# Patient Record
Sex: FEMALE | HISPANIC OR LATINO | Employment: FULL TIME | URBAN - METROPOLITAN AREA
[De-identification: names, ages, dates, MRNs, and addresses within clinical notes are randomized per-mention and may not be internally consistent; named-entity substitution may affect disease eponyms.]

---

## 2019-06-04 LAB — PAP-ABSTRACT: NORMAL

## 2021-02-17 ENCOUNTER — TRANSFERRED RECORDS (OUTPATIENT)
Dept: HEALTH INFORMATION MANAGEMENT | Facility: CLINIC | Age: 51
End: 2021-02-17
Payer: COMMERCIAL

## 2022-01-27 ENCOUNTER — OFFICE VISIT (OUTPATIENT)
Dept: FAMILY MEDICINE | Facility: CLINIC | Age: 52
End: 2022-01-27
Payer: COMMERCIAL

## 2022-01-27 ENCOUNTER — ANCILLARY PROCEDURE (OUTPATIENT)
Dept: GENERAL RADIOLOGY | Facility: CLINIC | Age: 52
End: 2022-01-27
Attending: PHYSICIAN ASSISTANT
Payer: COMMERCIAL

## 2022-01-27 VITALS
BODY MASS INDEX: 39.31 KG/M2 | SYSTOLIC BLOOD PRESSURE: 132 MMHG | HEART RATE: 69 BPM | WEIGHT: 182.2 LBS | DIASTOLIC BLOOD PRESSURE: 66 MMHG | TEMPERATURE: 98.6 F | HEIGHT: 57 IN | OXYGEN SATURATION: 98 %

## 2022-01-27 DIAGNOSIS — L71.9 ROSACEA: ICD-10-CM

## 2022-01-27 DIAGNOSIS — N39.46 MIXED INCONTINENCE URGE AND STRESS (MALE)(FEMALE): ICD-10-CM

## 2022-01-27 DIAGNOSIS — M54.50 ACUTE BILATERAL LOW BACK PAIN WITHOUT SCIATICA: ICD-10-CM

## 2022-01-27 DIAGNOSIS — M54.50 ACUTE BILATERAL LOW BACK PAIN WITHOUT SCIATICA: Primary | ICD-10-CM

## 2022-01-27 DIAGNOSIS — Z23 HIGH PRIORITY FOR 2019-NCOV VACCINE: ICD-10-CM

## 2022-01-27 LAB
ALBUMIN UR-MCNC: NEGATIVE MG/DL
APPEARANCE UR: CLEAR
BILIRUB UR QL STRIP: NEGATIVE
COLOR UR AUTO: YELLOW
GLUCOSE UR STRIP-MCNC: NEGATIVE MG/DL
HGB UR QL STRIP: NEGATIVE
KETONES UR STRIP-MCNC: NEGATIVE MG/DL
LEUKOCYTE ESTERASE UR QL STRIP: NEGATIVE
NITRATE UR QL: NEGATIVE
PH UR STRIP: 5.5 [PH] (ref 5–7)
SP GR UR STRIP: >=1.03 (ref 1–1.03)
UROBILINOGEN UR STRIP-ACNC: 0.2 E.U./DL

## 2022-01-27 PROCEDURE — 81003 URINALYSIS AUTO W/O SCOPE: CPT | Performed by: PHYSICIAN ASSISTANT

## 2022-01-27 PROCEDURE — 99203 OFFICE O/P NEW LOW 30 MIN: CPT | Performed by: PHYSICIAN ASSISTANT

## 2022-01-27 PROCEDURE — 91305 COVID-19,PF,PFIZER (12+ YRS): CPT | Performed by: PHYSICIAN ASSISTANT

## 2022-01-27 PROCEDURE — 0051A COVID-19,PF,PFIZER (12+ YRS): CPT | Performed by: PHYSICIAN ASSISTANT

## 2022-01-27 PROCEDURE — 72100 X-RAY EXAM L-S SPINE 2/3 VWS: CPT | Performed by: RADIOLOGY

## 2022-01-27 RX ORDER — NAPROXEN 500 MG/1
500 TABLET ORAL 2 TIMES DAILY WITH MEALS
Qty: 30 TABLET | Refills: 1 | Status: SHIPPED | OUTPATIENT
Start: 2022-01-27 | End: 2022-03-01

## 2022-01-27 ASSESSMENT — MIFFLIN-ST. JEOR: SCORE: 1306.71

## 2022-01-27 NOTE — PROGRESS NOTES
"  Assessment & Plan     Acute bilateral low back pain without sciatica  - UA macro with reflex to Microscopic and Culture - [facility]  - XR Lumbar Spine 2/3 Views; Future  - [facility] Referral; Future  - naproxen (NAPROSYN) 500 MG tablet; Take 1 tablet (500 mg) by mouth 2 times daily (with meals)    Rosacea  Patient education materials dispensed and reviewed.            BMI:   Estimated body mass index is 39.31 kg/m  as calculated from the following:    Height as of this encounter: 1.45 m (4' 9.09\").    Weight as of this encounter: 82.6 kg (182 lb 3.2 oz).   Weight management plan: Patient was referred to their PCP to discuss a diet and exercise plan.        Return in about 4 weeks (around 2/24/2022) for Routine preventive.    Linh Low PA-C  [facility] J LUIS Davis is a 51 year old who presents for the following health issues  accompanied by her self.    HPI     Pain History:  When did you first notice your pain? - 2 weeks  Have you seen any provider previously for this issue? No  How has your pain affected your ability to work? Yes-sometimes   Where in your body do you have pain? Back Pain  Onset/Duration: 2 weeks   Description:   Location of pain: middle-lower of back bilateral  Character of pain: sharp, dull ache and stabbing  Pain radiation: none  New numbness or weakness in legs, not attributed to pain: no   Intensity: Currently 7/10, At its worst 10/10  Progression of Symptoms: same  History:   Specific cause: none  Pain interferes with job: YES  History of back problems: no prior back problems  Any previous MRI or X-rays: None  Sees a specialist for back pain: No  Alleviating factors:   Improved by: NSAIDs    Precipitating factors:  Worsened by: Sitting, Lying Flat and Walking  Therapies tried and outcome: NSAIDs    Patient also c/o bumps on her facial skin.  OTC products seem to be helping.  Also c/o urine leaking and a sense of urinary urgency. " "      Review of Systems   Constitutional, HEENT, cardiovascular, pulmonary, gi and gu systems are negative, except as otherwise noted.      Objective    /66   Pulse 69   Temp 98.6  F (37  C) (Oral)   Ht 1.45 m (4' 9.09\")   Wt 82.6 kg (182 lb 3.2 oz)   SpO2 98%   BMI 39.31 kg/m    Body mass index is 39.31 kg/m .  Physical Exam   GENERAL: healthy, alert and no distress  RESP: lungs clear to auscultation - no rales, rhonchi or wheezes  ABDOMEN: soft, nontender, no hepatosplenomegaly, no masses and bowel sounds normal  BACK: no CVA tenderness, no paralumbar tenderness  Comprehensive back pain exam:  No tenderness, Pain limits the following motions: flexion and extension, Lower extremity strength functional and equal on both sides, Lower extremity reflexes within normal limits bilaterally and Straight leg raise negative bilaterally    Results for orders placed or performed in visit on 01/27/22 (from the past 24 hour(s))   UA macro with reflex to Microscopic and Culture - [facility] Collect    Specimen: Urine, Clean Catch   Result Value Ref Range    Color Urine Yellow Colorless, Straw, Light Yellow, Yellow    Appearance Urine Clear Clear    Glucose Urine Negative Negative mg/dL    Bilirubin Urine Negative Negative    Ketones Urine Negative Negative mg/dL    Specific Gravity Urine >=1.030 1.003 - 1.035    Blood Urine Negative Negative    pH Urine 5.5 5.0 - 7.0    Protein Albumin Urine Negative Negative mg/dL    Urobilinogen Urine 0.2 0.2, 1.0 E.U./dL    Nitrite Urine Negative Negative    Leukocyte Esterase Urine Negative Negative    Narrative    Microscopic not indicated               "

## 2022-01-27 NOTE — Clinical Note
Please abstract the following data from this visit with this patient into the appropriate field in Epic:        Other Tests found in the patient's chart through Chart Review/Care Everywhere:    Mammogram done by this group [facility] this date: 02/17/2021 and Pap smear done by this group [facility] this date: 06/04/2019    Note to Abstraction: If this section is blank, no results were found via Chart Review/Care Everywhere.

## 2022-01-27 NOTE — PATIENT INSTRUCTIONS
Patient Education   When You Have Low Back Pain  Caring for Your Back  You are not alone.  Low back pain is very common. Nearly half of all adults have low back pain in any given year.  The good news is that back pain is rarely a danger to your health. Most people can manage their back pain on their own and about half of them start feeling better within 2 weeks. In 9 out of 10 cases, low back pain goes away or no longer limits daily activity within 6 weeks.  Your outlook is good!  Your symptoms tell us that your low back pain is most likely not a danger to you. Most of the time we do not know the exact cause of low back pain, even if you see a doctor or have an MRI. However, treatment can still work without knowing the cause of the pain. Less than 1 in 100 people need surgery for their back pain.  What can I do about my low back pain?  There are three things you can do to ease low back pain and help it go away.    Use heat or cold packs.    Take medicine as directed.    Use positions, movements and exercises.  Using heat or cold packs  Try cold packs or gentle heat to ease your pain. Use whichever gives you the most relief. Apply the cold pack or heat for 15 minutes at a time, as often as needed.  Taking medicine    If your doctor has prescribed medicine, be sure to follow the directions.    If you take over-the-counter medicine, read and follow the directions.    Talk to your doctor if you have any questions.  Using positions, movements and exercises  Research tells us that moving your joints and muscles can help you recover from back pain. Such activity should be simple and gentle.  Use the positions in the photos as well as walking to help relieve your pain. Try taking a short walk every 3 to 4 hours during the day. Walk for a few minutes inside your home or take longer walks outside, on a treadmill or at a mall. Slowly increase the amount of time you walk.  Expect discomfort when you begin, but it should  lessen as your back starts to heal. When your back feels better, walk daily to keep your back and body healthy.  Finding a comfortable position  When your back pain is new, certain positions will ease your pain. Gently try each of the positions below until you find one that is helpful. Once you find a position of comfort, use it as often as you like when you are resting. You will recover faster if you combine rest with activity.         When should I call my doctor?  Your back pain should improve over the first couple of weeks. As it improves, you should be able to return to your normal activities. But call your doctor if:    You have a sudden change in your ability to control?your bladder or bowels.    You feel tingling in your groin or legs.    The pain spreads down your leg and into your foot.    Your toes, feet or leg muscles feel weak.    You feel generally unwell or sick.    Your pain does not get better or gets worse.    For informational purposes only. Not to replace the advice of your health care provider.  Copyright   2013 [employer]. All rights reserved. "[employer] 539503 - REV 03/16.       Patient Education      Qué es la incontinencia urinaria de esfuerzo?  La incontinencia urinaria de esfuerzo es un tipo común de problema para controlar la vejiga en las mujeres, aunque también puede producirse en los hombres. Ocurre cuando hay eli pérdida de orina debido a demasiada presión sobre el abdomen, por ejemplo, al estornudar, toser, hacer actividad física, levantar objetos, agacharse e incluso cambiar de posición. Esta afección puede ocurrir cuando se debilitan las estructuras que ayudan a retener la orina en la vejiga.   Cuáles son los síntomas de la incontinencia de esfuerzo?  Si tiene incontinencia de esfuerzo, es posible que se le escape orina holger las siguientes situaciones:    Tose, estornuda o ríe.    Levanta un objeto pesado.    Hace actividad física.     Strong pelvic floor muscles  and connective tissue, and a strong urethral sphincter, help keep urine in the bladder.       Weak or torn pelvic floor muscles and connective tissue, or a weak urethral sphincter, can let urine leak out of the bladder.      Control normal de la orina  La vejiga retiene la orina hasta que usted está lista para orinar. Las estructuras que ayudan son las siguientes:    Músculos del suelo pélvico y tejido conectivo. Stevie soporte a los órganos pélvicos y los mantienen en márquez lugar. Cuando los músculos y el tejido conectivo son christiano, la uretra y la vejiga tienen un buen soporte. Maalaea ayuda a mantener la uretra cerrada y a prevenir la incontinencia.    Esfínter en la uretra. Es eli driver de músculos que rodean la uretra. Cuando estos músculos son christiano, retienen la orina dentro de la vejiga. Estos músculos se relajan cuando desea que la orina salga de la vejiga.  Si tiene pérdidas de orina  Los músculos del suelo pélvico y el tejido conectivo pueden estirarse, debilitarse o romperse. Las estructuras debilitadas o rotas no pueden mary ellen soporte a la uretra y a la vejiga. El esfínter en la uretra también puede debilitarse. Todo esto puede producir la pérdida de la orina. Estos cambios pueden producirse debido a lo siguiente:    embarazo con parto vaginal o por cesárea;    tos emil (por bronquitis o tabaquismo);    sobrepeso u obesidad;    histerectomía u otra cirugía pélvica;    daño en los nervios.  Tratamiento  Hay muchos tratamientos disponibles y entre ellos, los siguientes:    cambios en el estilo de elise (bajar de peso, dejar de fumar, reducir el consumo de cafeína o bebidas alcohólicas y hacer ejercicios de fortalecimiento de la vejiga);    cirugía;    dispositivos médicos louise pesarios.    [id number] [employer] Todos los derechos reservados. Esta información no pretende sustituir la atención médica profesional. Sólo márquez médico puede diagnosticar y tratar un problema de ivonne.           Patient  Education     Rosácea [Rosacea]  La rosácea es eli enfermedad crónica de la piel que afecta la nicolas. En las fases tempranas causa rubor o sonrojamiento fácil. El enrojecimiento puede volverse permanente a medida que se dilatan los vasos sanguíneos pequeños de la nicolas. Puede bobby elevaciones pequeñas, rojizas y llenas de pus o pústulas. Se parece a un samia grave de acné, y ha sido llamada  acné adulto , anna no está relacionada a los factores que causan el acné.  La rosácea es eli enfermedad crónica que alterna entre brotes y remisiones a intervalos de varias semanas a varios meses. No se conoce la causa de la rosácea. Si no es tratada, la rosácea tiende a empeorar con el tiempo.  Algunos hombres con eli forma más severa de rosácea desarrollan rinofima. En monique trastorno, las glándulas sebáceas en la piel de la nariz se obstruyen y aumenta el tamaño de la nariz. Al mismo tiempo, se hinchan las mejillas. El alcohol puede aumentar el rubor, anna el uso del alcohol no es la causa del trastorno.  La rosácea puede tratarse con cremas o gel tópicas. Se usan antibióticos orales para los casos más severos. Generalmente se nota eli mejoría en las primeras cuatro semanas. Los vasos sanguíneos dilatados pueden tratarse con eli aguja eléctrica pequeña o con cirugía láser. La rinofima puede tratarse con cirugía o dermabrasión.  Cuidado En [place]:  1. Evite cosas que causan que se enrojezca o ruborice márquez nicolas, louise bebidas calientes, alimentos muy condimentados, cafeína y alcohol.  2. Roger ejercicio bajo techo o en un ambiente fresco para no calentarse demasiado.  3. Evite exponerse demasiado al sol. Use bloqueador solar con factor SPF de 15 o mayor.  4. Evite el clima extremadamente caliente o frío.  5. No se restriegue la nicolas ya que esto irritará márquez piel y causará más enrojecimiento.  6. Evite jabones christiano o humectantes con ingredientes irritantes. Edi vez necesite usar cosméticos hipoalergénicos.  Seguimiento  con márquez médico  de acuerdo a lo programado o louise le indicó nuestro personal. Contacte a márquez médico si márquez condición no responde a los medicamentos recetados.  Obtenga Atención Médica Inmediata  si se presenta enrojecimiento, o sensación de ardor o de tener arena en los ojos.    [id number] [employer]. Todos los derechos reservados. Esta información no pretende sustituir la atención médica profesional. Sólo márquez médico puede diagnosticar y tratar un problema de ivonne.

## 2022-02-13 ENCOUNTER — HEALTH MAINTENANCE LETTER (OUTPATIENT)
Age: 52
End: 2022-02-13

## 2022-03-01 ENCOUNTER — OFFICE VISIT (OUTPATIENT)
Dept: FAMILY MEDICINE | Facility: CLINIC | Age: 52
End: 2022-03-01
Payer: COMMERCIAL

## 2022-03-01 VITALS
BODY MASS INDEX: 39.74 KG/M2 | DIASTOLIC BLOOD PRESSURE: 68 MMHG | OXYGEN SATURATION: 97 % | TEMPERATURE: 98.2 F | HEIGHT: 57 IN | HEART RATE: 64 BPM | WEIGHT: 184.2 LBS | SYSTOLIC BLOOD PRESSURE: 118 MMHG

## 2022-03-01 DIAGNOSIS — Z13.220 SCREENING FOR HYPERLIPIDEMIA: ICD-10-CM

## 2022-03-01 DIAGNOSIS — Z11.59 NEED FOR HEPATITIS C SCREENING TEST: ICD-10-CM

## 2022-03-01 DIAGNOSIS — K59.00 CONSTIPATION, UNSPECIFIED CONSTIPATION TYPE: ICD-10-CM

## 2022-03-01 DIAGNOSIS — Z23 NEED FOR VACCINATION: ICD-10-CM

## 2022-03-01 DIAGNOSIS — Z00.00 ROUTINE GENERAL MEDICAL EXAMINATION AT A HEALTH CARE FACILITY: Primary | ICD-10-CM

## 2022-03-01 DIAGNOSIS — Z11.4 SCREENING FOR HIV (HUMAN IMMUNODEFICIENCY VIRUS): ICD-10-CM

## 2022-03-01 DIAGNOSIS — Z12.11 SCREEN FOR COLON CANCER: ICD-10-CM

## 2022-03-01 LAB
CHOLEST SERPL-MCNC: 244 MG/DL
FASTING STATUS PATIENT QL REPORTED: YES
FASTING STATUS PATIENT QL REPORTED: YES
GLUCOSE BLD-MCNC: 190 MG/DL (ref 70–99)
HCV AB SERPL QL IA: NONREACTIVE
HDLC SERPL-MCNC: 60 MG/DL
HIV 1+2 AB+HIV1 P24 AG SERPL QL IA: NONREACTIVE
LDLC SERPL CALC-MCNC: 146 MG/DL
NONHDLC SERPL-MCNC: 184 MG/DL
TRIGL SERPL-MCNC: 188 MG/DL

## 2022-03-01 PROCEDURE — 82947 ASSAY GLUCOSE BLOOD QUANT: CPT | Performed by: PHYSICIAN ASSISTANT

## 2022-03-01 PROCEDURE — 36415 COLL VENOUS BLD VENIPUNCTURE: CPT | Performed by: PHYSICIAN ASSISTANT

## 2022-03-01 PROCEDURE — 90750 HZV VACC RECOMBINANT IM: CPT | Performed by: PHYSICIAN ASSISTANT

## 2022-03-01 PROCEDURE — 90682 RIV4 VACC RECOMBINANT DNA IM: CPT | Performed by: PHYSICIAN ASSISTANT

## 2022-03-01 PROCEDURE — 87389 HIV-1 AG W/HIV-1&-2 AB AG IA: CPT | Performed by: PHYSICIAN ASSISTANT

## 2022-03-01 PROCEDURE — 90472 IMMUNIZATION ADMIN EACH ADD: CPT | Performed by: PHYSICIAN ASSISTANT

## 2022-03-01 PROCEDURE — 86803 HEPATITIS C AB TEST: CPT | Performed by: PHYSICIAN ASSISTANT

## 2022-03-01 PROCEDURE — 91305 COVID-19,PF,PFIZER (12+ YRS): CPT | Performed by: PHYSICIAN ASSISTANT

## 2022-03-01 PROCEDURE — 99396 PREV VISIT EST AGE 40-64: CPT | Mod: 25 | Performed by: PHYSICIAN ASSISTANT

## 2022-03-01 PROCEDURE — 80061 LIPID PANEL: CPT | Performed by: PHYSICIAN ASSISTANT

## 2022-03-01 PROCEDURE — 90471 IMMUNIZATION ADMIN: CPT | Performed by: PHYSICIAN ASSISTANT

## 2022-03-01 PROCEDURE — 0052A COVID-19,PF,PFIZER (12+ YRS): CPT | Performed by: PHYSICIAN ASSISTANT

## 2022-03-01 ASSESSMENT — ENCOUNTER SYMPTOMS
COUGH: 0
JOINT SWELLING: 0
DIARRHEA: 0
PARESTHESIAS: 0
EYE PAIN: 0
PALPITATIONS: 0
HEADACHES: 0
HEMATURIA: 0
FREQUENCY: 1
ARTHRALGIAS: 0
SORE THROAT: 0
HEMATOCHEZIA: 0
MYALGIAS: 1
NERVOUS/ANXIOUS: 0
DYSURIA: 0
WEAKNESS: 0
CONSTIPATION: 1
FEVER: 0
ABDOMINAL PAIN: 0
CHILLS: 0
DIZZINESS: 0
BREAST MASS: 0
NAUSEA: 0

## 2022-03-01 NOTE — PATIENT INSTRUCTIONS
Preventive Health Recommendations  Female Ages 50 - 64    Yearly exam: See your health care provider every year in order to  o Review health changes.   o Discuss preventive care.    o Review your medicines if your doctor has prescribed any.      Get a Pap test every three years (unless you have an abnormal result and your provider advises testing more often).    If you get Pap tests with HPV test, you only need to test every 5 years, unless you have an abnormal result.     You do not need a Pap test if your uterus was removed (hysterectomy) and you have not had cancer.    You should be tested each year for STDs (sexually transmitted diseases) if you're at risk.     Have a mammogram every 1 to 2 years.    Have a colonoscopy at age 50, or have a yearly FIT test (stool test). These exams screen for colon cancer.      Have a cholesterol test every 5 years, or more often if advised.    Have a diabetes test (fasting glucose) every three years. If you are at risk for diabetes, you should have this test more often.     If you are at risk for osteoporosis (brittle bone disease), think about having a bone density scan (DEXA).    Shots: Get a flu shot each year. Get a tetanus shot every 10 years.    Nutrition:     Eat at least 5 servings of fruits and vegetables each day.    Eat whole-grain bread, whole-wheat pasta and brown rice instead of white grains and rice.    Get adequate Calcium and Vitamin D.     Lifestyle    Exercise at least 150 minutes a week (30 minutes a day, 5 days a week). This will help you control your weight and prevent disease.    Limit alcohol to one drink per day.    No smoking.     Wear sunscreen to prevent skin cancer.     See your dentist every six months for an exam and cleaning.    See your eye doctor every 1 to 2 years.    Patient Education     Treating Constipation  Constipation is a common and often uncomfortable problem. Constipation means you have bowel movements fewer than 3 times per week.  Or that you strain to pass hard, dry stool. It can last a short time. Or it can be a problem that never seems to go away. The good news is that it can often be treated and controlled.   Eat more fiber  One of the best ways to help treat constipation is to increase your fiber intake. You can do this either through diet or by using fiber supplements. Fiber (in whole grains, fruits, and vegetables) adds bulk and absorbs water to soften the stool. This helps the stool pass through the colon more easily. When you increase your fiber intake, do it slowly to prevent side effects such as bloating. Also increase the amount of water that you drink. Eating more of these foods can add fiber to your diet:     High-fiber cereals    Whole grains, bran, and brown rice    Vegetables such as carrots, broccoli, and greens    Fresh fruits (especially apples, pears, and dried fruits such as raisins and apricots)    Nuts and legumes (especially beans such as lentils, kidney beans, and lima beans)  Get physically active  Exercise helps improve the working of your colon which helps ease constipation. Try to get some physical activity every day. If you haven t been active for a while, talk with your healthcare provider before starting again.     Laxatives  Your healthcare provider may suggest an over-the-counter product to help ease your constipation. He or she may suggest the use of bulk-forming agents or laxatives. Laxatives, if used as directed, are common and safe. Follow directions carefully when using them. See your provider for new-onset constipation, or long-term constipation, to rule out other causes such as medicines or thyroid disease.   Modanisa last reviewed this educational content on 6/1/2019 2000-2021 [employer]. All rights reserved. This information is not intended as a substitute for professional medical care. Always follow your healthcare professional's instructions.

## 2022-03-01 NOTE — Clinical Note
Please abstract the following data from this visit with this patient into the appropriate field in Epic:      Other Tests found in the patient's chart through Chart Review/Care Everywhere:    Mammogram done by this group [facility] this date: 02/17/2021 and Pap smear done by this group [facility] this date: 06/04/2019    Note to Abstraction: If this section is blank, no results were found via Chart Review/Care Everywhere.

## 2022-03-01 NOTE — PROGRESS NOTES
SUBJECTIVE:   CC: Susan Velez is an 51 year old woman who presents for preventive health visit.       Patient has been advised of split billing requirements and indicates understanding: Yes  Healthy Habits:     Getting at least 3 servings of Calcium per day:  NO    Bi-annual eye exam:  NO    Dental care twice a year:  Yes    Sleep apnea or symptoms of sleep apnea:  None    Diet:  Regular (no restrictions)    Frequency of exercise:  None    Taking medications regularly:  Yes    Barriers to taking medications:  None    Medication side effects:  None    PHQ-2 Total Score: 1    Additional concerns today:  No          -------------------------------------    Today's PHQ-2 Score:   PHQ-2 ( 1999 Pfizer) 3/1/2022   Q1: Little interest or pleasure in doing things 0   Q2: Feeling down, depressed or hopeless 1   PHQ-2 Score 1   Q1: Little interest or pleasure in doing things Not at all   Q2: Feeling down, depressed or hopeless Several days   PHQ-2 Score 1       Abuse: Current or Past (Physical, Sexual or Emotional) - No  Do you feel safe in your environment? Yes    Have you ever done Advance Care Planning? (For example, a Health Directive, POLST, or a discussion with a medical provider or your loved ones about your wishes): No, advance care planning information given to patient to review.  Patient declined advance care planning discussion at this time.    Social History     Tobacco Use     Smoking status: Former Smoker     Smokeless tobacco: Never Used   Substance Use Topics     Alcohol use: Never         Alcohol Use 3/1/2022   Prescreen: >3 drinks/day or >7 drinks/week? No       Reviewed orders with patient.  Reviewed health maintenance and updated orders accordingly - Yes  Lab work is in process    Breast Cancer Screening:    FHS-7:   Breast CA Risk Assessment (FHS-7) 3/1/2022   Did any of your first-degree relatives have breast or ovarian cancer? No   Did any of your relatives have bilateral breast cancer? No   Did  "any man in your family have breast cancer? No   Did any woman in your family have breast and ovarian cancer? Yes   Did any woman in your family have breast cancer before age 50 y? Yes   Do you have 2 or more relatives with breast and/or ovarian cancer? No   Do you have 2 or more relatives with breast and/or bowel cancer? No         Pertinent mammograms are reviewed under the imaging tab.    History of abnormal Pap smear: NO - age 30-65 PAP every 5 years with negative HPV co-testing recommended     Reviewed and updated as needed this visit by clinical staff   Tobacco  Allergies  Meds   Med Hx  Surg Hx  Fam Hx  Soc Hx          Review of Systems   Constitutional: Negative for chills and fever.   HENT: Negative for congestion, ear pain, hearing loss and sore throat.    Eyes: Negative for pain and visual disturbance.   Respiratory: Negative for cough.    Cardiovascular: Negative for chest pain, palpitations and peripheral edema.   Gastrointestinal: Positive for constipation. Negative for abdominal pain, diarrhea, hematochezia and nausea.   Breasts:  Negative for tenderness, breast mass and discharge.   Genitourinary: Positive for frequency. Negative for dysuria, genital sores, hematuria, pelvic pain, urgency, vaginal bleeding and vaginal discharge.   Musculoskeletal: Positive for myalgias. Negative for arthralgias and joint swelling.   Skin: Negative for rash.   Neurological: Negative for dizziness, weakness, headaches and paresthesias.   Psychiatric/Behavioral: Positive for mood changes. The patient is not nervous/anxious.           OBJECTIVE:   /68   Pulse 64   Temp 98.2  F (36.8  C) (Oral)   Ht 1.45 m (4' 9.09\")   Wt 83.6 kg (184 lb 3.2 oz)   SpO2 97%   BMI 39.74 kg/m       Physical Exam  GENERAL APPEARANCE: healthy, alert and no distress  EYES: Eyes grossly normal to inspection, PERRL and conjunctivae and sclerae normal  HENT: ear canals and TM's normal, nose and mouth without ulcers or lesions, " "oropharynx clear and oral mucous membranes moist  NECK: no adenopathy, no asymmetry, masses, or scars and thyroid normal to palpation  RESP: lungs clear to auscultation - no rales, rhonchi or wheezes  CV: regular rate and rhythm, normal S1 S2, no S3 or S4, no murmur, click or rub, no peripheral edema and peripheral pulses strong  ABDOMEN: soft, nontender, no hepatosplenomegaly, no masses and bowel sounds normal  MS: no musculoskeletal defects are noted and gait is age appropriate without ataxia  SKIN: no suspicious lesions or rashes  NEURO: Normal strength and tone, sensory exam grossly normal, mentation intact and speech normal  PSYCH: mentation appears normal and affect normal/bright    Diagnostic Test Results:  none     ASSESSMENT/PLAN:       ICD-10-CM    1. Routine general medical examination at a health care facility  Z00.00 GLUCOSE     Lipid panel reflex to direct LDL Fasting   2. Screen for colon cancer  Z12.11 Adult Gastro Ref - Procedure Only   3. Screening for HIV (human immunodeficiency virus)  Z11.4 HIV Antigen Antibody Combo   4. Need for hepatitis C screening test  Z11.59 Hepatitis C Screen Reflex to HCV RNA Quant and Genotype   5. Screening for hyperlipidemia  Z13.220 Lipid panel reflex to direct LDL Fasting   6. Constipation, unspecified constipation type  K59.00    7. Need for vaccination  Z23 ZOSTER VACCINE RECOMBINANT (Shingrix)     COVID-19,PF,PFIZER (12+ YRS)     INFLUENZA QUAD, RECOMBINANT, P-FREE (RIV4) (FLUBLOK)           COUNSELING:  Reviewed preventive health counseling, as reflected in patient instructions    Estimated body mass index is 39.74 kg/m  as calculated from the following:    Height as of this encounter: 1.45 m (4' 9.09\").    Weight as of this encounter: 83.6 kg (184 lb 3.2 oz).    Weight management plan: Discussed healthy diet and exercise guidelines    She reports that she has quit smoking. She has never used smokeless tobacco.      Counseling Resources:  ATP IV " Guidelines  Pooled Cohorts Equation Calculator  Breast Cancer Risk Calculator  BRCA-Related Cancer Risk Assessment: FHS-7 Tool  FRAX Risk Assessment  ICSI Preventive Guidelines  Dietary Guidelines for Americans, 2010  USDA's MyPlate  ASA Prophylaxis  Lung CA Screening    AISHWARYA S[facility]

## 2022-03-08 ENCOUNTER — VIRTUAL VISIT (OUTPATIENT)
Dept: FAMILY MEDICINE | Facility: CLINIC | Age: 52
End: 2022-03-08
Payer: COMMERCIAL

## 2022-03-08 DIAGNOSIS — E11.65 UNCONTROLLED TYPE 2 DIABETES MELLITUS WITH HYPERGLYCEMIA (H): Primary | ICD-10-CM

## 2022-03-08 PROCEDURE — 99214 OFFICE O/P EST MOD 30 MIN: CPT | Mod: 95 | Performed by: PHYSICIAN ASSISTANT

## 2022-03-08 RX ORDER — METFORMIN HCL 500 MG
500 TABLET, EXTENDED RELEASE 24 HR ORAL 2 TIMES DAILY WITH MEALS
Qty: 60 TABLET | Refills: 1 | Status: SHIPPED | OUTPATIENT
Start: 2022-03-08 | End: 2022-05-11

## 2022-03-08 NOTE — PROGRESS NOTES
Susan is a 51 year old who is being evaluated via a billable telephone visit.      What phone number would you like to be contacted at? [phone]  How would you like to obtain your AVS? MyChart    Assessment & Plan     Uncontrolled type 2 diabetes mellitus with hyperglycemia (H)  - metFORMIN (GLUCOPHAGE-XR) 500 MG 24 hr tablet; Take 1 tablet (500 mg) by mouth 2 times daily (with meals)  - [facility] Adult Diabetes Educator Referral; Future  - Hemoglobin A1c; Future  - Comprehensive metabolic panel (BMP + Alb, Alk Phos, ALT, AST, Total. Bili, TP); Future  - TSH with free T4 reflex; Future      Return in about 4 months (around 7/8/2022) for Follow up.    Linh Low PA-C  [facility]    Martin Davis is a 51 year old who presents for the following health issues  accompanied by her self.    HPI     Patient presents with:  Results: Lab Results      Reviewed that Patient is likely diabetic with fasting glucose in this range.  Notes she has had labs at another organization recently and believes this is new.  Her  is also diabetic.  Reviewed the need for DM education and use of CGM.  She is amenable to this.  Medication start today.      Review of Systems   Constitutional, HEENT, cardiovascular, pulmonary, gi and gu systems are negative, except as otherwise noted.      Objective           Vitals:  No vitals were obtained today due to virtual visit.    Physical Exam   healthy, alert and no distress  PSYCH: Alert and oriented times 3; coherent speech, normal   rate and volume, able to articulate logical thoughts, able   to abstract reason, no tangential thoughts, no hallucinations   or delusions  Her affect is normal  RESP: No cough, no audible wheezing, able to talk in full sentences  Remainder of exam unable to be completed due to telephone visits                Phone call duration: 15 minutes

## 2022-04-05 ENCOUNTER — ALLIED HEALTH/NURSE VISIT (OUTPATIENT)
Dept: EDUCATION SERVICES | Facility: CLINIC | Age: 52
End: 2022-04-05
Attending: PHYSICIAN ASSISTANT
Payer: COMMERCIAL

## 2022-04-05 DIAGNOSIS — E11.65 UNCONTROLLED TYPE 2 DIABETES MELLITUS WITH HYPERGLYCEMIA (H): ICD-10-CM

## 2022-04-05 PROCEDURE — G0108 DIAB MANAGE TRN  PER INDIV: HCPCS | Performed by: DIETITIAN, REGISTERED

## 2022-04-05 RX ORDER — LANCETS 33 GAUGE
1 EACH MISCELLANEOUS 2 TIMES DAILY
Qty: 100 EACH | Refills: 12 | Status: SHIPPED | OUTPATIENT
Start: 2022-04-05 | End: 2023-09-08

## 2022-04-05 RX ORDER — BLOOD-GLUCOSE METER
1 EACH MISCELLANEOUS
Qty: 1 KIT | Refills: 0 | Status: SHIPPED | OUTPATIENT
Start: 2022-04-07 | End: 2023-09-08

## 2022-04-05 NOTE — PATIENT INSTRUCTIONS
Metformin ok to take 2 with lunch    Try any plain yogurt  Cereal- try Kashi go chocolate    Berries ok at bedtime, have banana at breakfast or am     Aim for adding in activity- angy    Try walking 30 daily consistently- 5 days per week.     [facility] for the [facility] Area:  For Your Diabetes Education and Nutrition Appointments Call:  [phone]   For Diabetes Education or Nutrition Related Questions:   Phone: [phone]  Send [employer] [address] Message   If you need a medication refill please contact your pharmacy. Please allow 3 business days for your refills to be completed.

## 2022-04-05 NOTE — Clinical Note
Does she need to schedule those labs?  Or is that for future appointments?  If she needs to schedule a lab visit could someone call her?

## 2022-04-05 NOTE — PROGRESS NOTES
Diabetes Self-Management Education & Support    Presents for: Initial Assessment for new diagnosis    Type of Visit: In Person    How would patient like to obtain AVS?Printed    ASSESSMENT:  Has been trying to cut out all bread and added sugar since diagnosis.  She has been thinking about doing keto again as she lost weight with it before.  She know she needs to eat more vegetables, and would like to get back to angy.  She has some knee and back pain from recent weight gain.       Patient's most recent No results found for: A1C not been tested     Diabetes knowledge and skills assessment:   Patient is knowledgeable in diabetes management concepts related to: Healthy Coping    Continue education with the following diabetes management concepts: Healthy Eating, Being Active, Monitoring, Taking Medication and Reducing Risks    Based on learning assessment above, most appropriate setting for further diabetes education would be: Individual setting.    INTERVENTIONS:    Education provided today on:  AADE Self-Care Behaviors:  Diabetes Pathophysiology  Healthy Eating: consistency in amount, composition, and timing of food intake, weight reduction, portion control, plate planning method, label reading and discussed example meals.     Being Active: Discussed adding in walking to start.  Monitoring: log and interpret results, individual blood glucose targets and frequency of monitoring  Taking Medication: action of prescribed medication and refills.  Recommended taking 1000mg with lunch as it's her biggest meal.     Reducing Risks: appropriate dental care and annual eye exam  Patient was instructed on Contour Next One meter and was able to provide an accurate return demonstration. Patient's blood glucose reading today was 115 mg/dL. One hour after eating a cookie.    Opportunities for ongoing education and support in diabetes-self management were discussed. Pt verbalized understanding of concepts discussed and recommendations  "provided today.       Education Materials Provided:  [place] Healthy Living with Diabetes Book and My Plate Planner          PLAN    Use plate method for meals.   Aim to add in 30 minutes of actiity daily.     Topics to cover at upcoming visits: Reducing Risks  Follow-up: as needed if next A1c is elevated.    See Goals Section for co-developed, patient-stated behavior change goals.  AVS provided to patient today.          SUBJECTIVE / OBJECTIVE:  Presents for: Initial Assessment for new diagnosis  Accompanied by: Self  Diabetes education in the past 24mo: No  Focus of Visit: Monitoring, Taking Medication, Healthy Eating, Assistance w/ making life changes  Diabetes type: Type 2  Date of diagnosis: 3/2022  Disease course: Improving  How confident are you filling out medical forms by yourself:: Extremely  Diabetes management related comments/concerns: Do I take the metformin forever?  Transportation concerns: No  Difficulty affording diabetes medication?: No  Difficulty affording diabetes testing supplies?: No  Other concerns:: None  Cultural Influences/Ethnic Background:  Choose not to answer    Diabetes Symptoms & Complications:  Fatigue: No  Neuropathy: No  Polydipsia: No  Polyphagia: No  Polyuria: No  Visual change: No  Slow healing wounds: No       Patient Problem List and Family Medical History reviewed for relevant medical history, current medical status, and diabetes risk factors.    Vitals:  There were no vitals taken for this visit.  Estimated body mass index is 39.74 kg/m  as calculated from the following:    Height as of 3/1/22: 1.45 m (4' 9.09\").    Weight as of 3/1/22: 83.6 kg (184 lb 3.2 oz).   Last 3 BP:   BP Readings from Last 3 Encounters:   03/01/22 118/68   01/27/22 132/66       History   Smoking Status     Former Smoker   Smokeless Tobacco     Never Used       Labs:  No results found for: A1C  Lab Results   Component Value Date     03/01/2022     Lab Results   Component Value Date "     03/01/2022     Direct Measure HDL   Date Value Ref Range Status   03/01/2022 60 >=50 mg/dL Final   ]  No results found for: GFRESTIMATED  No results found for: GFRESTBLACK  No results found for: CR  No results found for: MICROALBUMIN    Healthy Eating:  Healthy Eating Assessed Today: Yes  Cultural/Gnosticist diet restrictions?: No  Meal planning/habits: Low carb  Meals include: Breakfast, Lunch, Evening Snack  Breakfast: Coffee, large bread and fruit  Lunch: chicken, salsa, potatoes OR quesadilla, pepper, cheese OR rice dish- largest meal of the day  Dinner: cereal OR small snack  Beverages: Water, Coffee  Has patient met with a dietitian in the past?: No    Being Active:  Being Active Assessed Today: Yes  Exercise:: Currently not exercising (used to do angy)    Monitoring:  Monitoring Assessed Today: Yes (has been using husbands)  Did patient bring glucose meter to appointment? : No  Times checking blood sugar at home (number): Never    Taking Medications:  Diabetes Medication(s)     Biguanides       metFORMIN (GLUCOPHAGE-XR) 500 MG 24 hr tablet    Take 1 tablet (500 mg) by mouth 2 times daily (with meals)          Taking Medication Assessed Today: Yes  Current Treatments: Oral Medication (taken by mouth)  Problems taking diabetes medications regularly?: No  Diabetes medication side effects?: No    Problem Solving:  Problem Solving Assessed Today: No  Is the patient at risk for hypoglycemia?: No  Is the patient at risk for DKA?: No    Reducing Risks:  Diabetes Risks: Family History  CAD Risks: Sedentary lifestyle, Diabetes Mellitus, Obesity  Has dilated eye exam at least once a year?: No  Sees dentist every 6 months?: No    Healthy Coping:  Healthy Coping Assessed Today: No  Emotional response to diabetes: Ready to learn  Informal Support system:: Family  Stage of change: ACTION (Actively working towards change)  Support resources: None  Patient Activation Measure Survey Score:  No flowsheet data  found.          Gita Olmos MS, RD, LD, CDE  Time Spent: 60 minutes  Encounter Type: Individual        Any diabetes medication dose changes were made via the Certified Diabetes Care & Education Protocol in collaboration with the patient's primary care provider. A copy of this encounter was shared with the provider.

## 2022-04-05 NOTE — LETTER
4/5/2022         RE: Susan Velez  [address]  [place] [address]        Dear Colleague,    Thank you for referring your patient, Susan Velez, to the [facility]. Please see a copy of my visit note below.    Diabetes Self-Management Education & Support    Presents for: Initial Assessment for new diagnosis    Type of Visit: In Person    How would patient like to obtain AVS?Printed    ASSESSMENT:  Has been trying to cut out all bread and added sugar since diagnosis.  She has been thinking about doing keto again as she lost weight with it before.  She know she needs to eat more vegetables, and would like to get back to angy.  She has some knee and back pain from recent weight gain.       Patient's most recent No results found for: A1C not been tested     Diabetes knowledge and skills assessment:   Patient is knowledgeable in diabetes management concepts related to: Healthy Coping    Continue education with the following diabetes management concepts: Healthy Eating, Being Active, Monitoring, Taking Medication and Reducing Risks    Based on learning assessment above, most appropriate setting for further diabetes education would be: Individual setting.    INTERVENTIONS:    Education provided today on:  AADE Self-Care Behaviors:  Diabetes Pathophysiology  Healthy Eating: consistency in amount, composition, and timing of food intake, weight reduction, portion control, plate planning method, label reading and discussed example meals.     Being Active: Discussed adding in walking to start.  Monitoring: log and interpret results, individual blood glucose targets and frequency of monitoring  Taking Medication: action of prescribed medication and refills.  Recommended taking 1000mg with lunch as it's her biggest meal.     Reducing Risks: appropriate dental care and annual eye exam  Patient was instructed on Contour Next One meter and was able to provide an accurate return demonstration.  "Patient's blood glucose reading today was 115 mg/dL. One hour after eating a cookie.    Opportunities for ongoing education and support in diabetes-self management were discussed. Pt verbalized understanding of concepts discussed and recommendations provided today.       Education Materials Provided:  [place] Healthy Living with Diabetes Book and My Plate Planner          PLAN    Use plate method for meals.   Aim to add in 30 minutes of actiity daily.     Topics to cover at upcoming visits: Reducing Risks  Follow-up: as needed if next A1c is elevated.    See Goals Section for co-developed, patient-stated behavior change goals.  AVS provided to patient today.          SUBJECTIVE / OBJECTIVE:  Presents for: Initial Assessment for new diagnosis  Accompanied by: Self  Diabetes education in the past 24mo: No  Focus of Visit: Monitoring, Taking Medication, Healthy Eating, Assistance w/ making life changes  Diabetes type: Type 2  Date of diagnosis: 3/2022  Disease course: Improving  How confident are you filling out medical forms by yourself:: Extremely  Diabetes management related comments/concerns: Do I take the metformin forever?  Transportation concerns: No  Difficulty affording diabetes medication?: No  Difficulty affording diabetes testing supplies?: No  Other concerns:: None  Cultural Influences/Ethnic Background:  Choose not to answer    Diabetes Symptoms & Complications:  Fatigue: No  Neuropathy: No  Polydipsia: No  Polyphagia: No  Polyuria: No  Visual change: No  Slow healing wounds: No       Patient Problem List and Family Medical History reviewed for relevant medical history, current medical status, and diabetes risk factors.    Vitals:  There were no vitals taken for this visit.  Estimated body mass index is 39.74 kg/m  as calculated from the following:    Height as of 3/1/22: 1.45 m (4' 9.09\").    Weight as of 3/1/22: 83.6 kg (184 lb 3.2 oz).   Last 3 BP:   BP Readings from Last 3 Encounters: "   03/01/22 118/68   01/27/22 132/66       History   Smoking Status     Former Smoker   Smokeless Tobacco     Never Used       Labs:  No results found for: A1C  Lab Results   Component Value Date     03/01/2022     Lab Results   Component Value Date     03/01/2022     Direct Measure HDL   Date Value Ref Range Status   03/01/2022 60 >=50 mg/dL Final   ]  No results found for: GFRESTIMATED  No results found for: GFRESTBLACK  No results found for: CR  No results found for: MICROALBUMIN    Healthy Eating:  Healthy Eating Assessed Today: Yes  Cultural/Yarsanism diet restrictions?: No  Meal planning/habits: Low carb  Meals include: Breakfast, Lunch, Evening Snack  Breakfast: Coffee, large bread and fruit  Lunch: chicken, salsa, potatoes OR quesadilla, pepper, cheese OR rice dish- largest meal of the day  Dinner: cereal OR small snack  Beverages: Water, Coffee  Has patient met with a dietitian in the past?: No    Being Active:  Being Active Assessed Today: Yes  Exercise:: Currently not exercising (used to do [employer])    Monitoring:  Monitoring Assessed Today: Yes (has been using husbands)  Did patient bring glucose meter to appointment? : No  Times checking blood sugar at home (number): Never    Taking Medications:  Diabetes Medication(s)     Biguanides       metFORMIN (GLUCOPHAGE-XR) 500 MG 24 hr tablet    Take 1 tablet (500 mg) by mouth 2 times daily (with meals)          Taking Medication Assessed Today: Yes  Current Treatments: Oral Medication (taken by mouth)  Problems taking diabetes medications regularly?: No  Diabetes medication side effects?: No    Problem Solving:  Problem Solving Assessed Today: No  Is the patient at risk for hypoglycemia?: No  Is the patient at risk for DKA?: No    Reducing Risks:  Diabetes Risks: Family History  CAD Risks: Sedentary lifestyle, Diabetes Mellitus, Obesity  Has dilated eye exam at least once a year?: No  Sees dentist every 6 months?: No    Healthy Coping:  Healthy  Coping Assessed Today: No  Emotional response to diabetes: Ready to learn  Informal Support system:: Family  Stage of change: ACTION (Actively working towards change)  Support resources: None  Patient Activation Measure Survey Score:  No flowsheet data found.          Gita Olmos MS, RD, LD, CDE  Time Spent: 60 minutes  Encounter Type: Individual        Any diabetes medication dose changes were made via the Certified Diabetes Care & Education Protocol in collaboration with the patient's primary care provider. A copy of this encounter was shared with the provider.

## 2022-04-10 ENCOUNTER — HEALTH MAINTENANCE LETTER (OUTPATIENT)
Age: 52
End: 2022-04-10

## 2022-04-12 ENCOUNTER — LAB (OUTPATIENT)
Dept: LAB | Facility: CLINIC | Age: 52
End: 2022-04-12
Payer: COMMERCIAL

## 2022-04-12 DIAGNOSIS — E11.65 UNCONTROLLED TYPE 2 DIABETES MELLITUS WITH HYPERGLYCEMIA (H): Primary | ICD-10-CM

## 2022-04-12 LAB
ALBUMIN SERPL-MCNC: 4.1 G/DL (ref 3.4–5)
ALP SERPL-CCNC: 93 U/L (ref 40–150)
ALT SERPL W P-5'-P-CCNC: 56 U/L (ref 0–50)
ANION GAP SERPL CALCULATED.3IONS-SCNC: 6 MMOL/L (ref 3–14)
AST SERPL W P-5'-P-CCNC: 27 U/L (ref 0–45)
BILIRUB SERPL-MCNC: 0.4 MG/DL (ref 0.2–1.3)
BUN SERPL-MCNC: 13 MG/DL (ref 7–30)
CALCIUM SERPL-MCNC: 9.4 MG/DL (ref 8.5–10.1)
CHLORIDE BLD-SCNC: 108 MMOL/L (ref 94–109)
CO2 SERPL-SCNC: 27 MMOL/L (ref 20–32)
CREAT SERPL-MCNC: 0.74 MG/DL (ref 0.52–1.04)
CREAT UR-MCNC: 173 MG/DL
GFR SERPL CREATININE-BSD FRML MDRD: >90 ML/MIN/1.73M2
GLUCOSE BLD-MCNC: 123 MG/DL (ref 70–99)
HBA1C MFR BLD: 6.6 % (ref 0–5.6)
MICROALBUMIN UR-MCNC: 16 MG/L
MICROALBUMIN/CREAT UR: 9.25 MG/G CR (ref 0–25)
POTASSIUM BLD-SCNC: 3.9 MMOL/L (ref 3.4–5.3)
PROT SERPL-MCNC: 7.9 G/DL (ref 6.8–8.8)
SODIUM SERPL-SCNC: 141 MMOL/L (ref 133–144)
TSH SERPL DL<=0.005 MIU/L-ACNC: 1.54 MU/L (ref 0.4–4)

## 2022-04-12 PROCEDURE — 83036 HEMOGLOBIN GLYCOSYLATED A1C: CPT

## 2022-04-12 PROCEDURE — 36415 COLL VENOUS BLD VENIPUNCTURE: CPT

## 2022-04-12 PROCEDURE — 84443 ASSAY THYROID STIM HORMONE: CPT

## 2022-04-12 PROCEDURE — 80053 COMPREHEN METABOLIC PANEL: CPT

## 2022-04-12 PROCEDURE — 82043 UR ALBUMIN QUANTITATIVE: CPT

## 2022-05-10 DIAGNOSIS — E11.65 UNCONTROLLED TYPE 2 DIABETES MELLITUS WITH HYPERGLYCEMIA (H): ICD-10-CM

## 2022-05-11 RX ORDER — METFORMIN HCL 500 MG
500 TABLET, EXTENDED RELEASE 24 HR ORAL 2 TIMES DAILY WITH MEALS
Qty: 180 TABLET | Refills: 0 | Status: SHIPPED | OUTPATIENT
Start: 2022-05-11 | End: 2022-11-10

## 2022-05-11 NOTE — TELEPHONE ENCOUNTER
Prescription approved per [facility] Refill Protocol.    Katherine Bermudez RN

## 2022-06-05 ENCOUNTER — HEALTH MAINTENANCE LETTER (OUTPATIENT)
Age: 52
End: 2022-06-05

## 2022-07-31 ENCOUNTER — HEALTH MAINTENANCE LETTER (OUTPATIENT)
Age: 52
End: 2022-07-31

## 2022-10-16 ENCOUNTER — HEALTH MAINTENANCE LETTER (OUTPATIENT)
Age: 52
End: 2022-10-16

## 2022-11-10 DIAGNOSIS — E11.65 UNCONTROLLED TYPE 2 DIABETES MELLITUS WITH HYPERGLYCEMIA (H): ICD-10-CM

## 2022-11-10 RX ORDER — METFORMIN HCL 500 MG
500 TABLET, EXTENDED RELEASE 24 HR ORAL 2 TIMES DAILY WITH MEALS
Qty: 180 TABLET | Refills: 0 | Status: SHIPPED | OUTPATIENT
Start: 2022-11-10 | End: 2023-02-22

## 2022-12-03 ENCOUNTER — HEALTH MAINTENANCE LETTER (OUTPATIENT)
Age: 52
End: 2022-12-03

## 2023-01-25 ENCOUNTER — TELEPHONE (OUTPATIENT)
Dept: FAMILY MEDICINE | Facility: CLINIC | Age: 53
End: 2023-01-25
Payer: COMMERCIAL

## 2023-01-25 NOTE — TELEPHONE ENCOUNTER
Patient Quality Outreach    Patient is due for the following:   Diabetes -  Eye Exam    Next Steps:   Schedule a office visit for Diabetic Eye Exam    Type of outreach:    Sent [employer] message.      Questions for provider review:    None     Lexi Philip CMA  Chart routed to Care Team.

## 2023-02-08 ENCOUNTER — TELEPHONE (OUTPATIENT)
Dept: FAMILY MEDICINE | Facility: CLINIC | Age: 53
End: 2023-02-08
Payer: COMMERCIAL

## 2023-02-08 NOTE — TELEPHONE ENCOUNTER
Patient Quality Outreach    Patient is due for the following:   Diabetes -  A1C  Colon Cancer Screening  Breast Cancer Screening - Mammogram  Cervical Cancer Screening - PAP Needed      Topic Date Due     Pneumococcal Vaccine (1 - PCV) Never done     Hepatitis B Vaccine (3 of 3 - 19+ 3-dose series) 05/03/2009     COVID-19 Vaccine (3 - Booster for Pfizer series) 04/26/2022     Flu Vaccine (1) 09/01/2022     Zoster (Shingles) Vaccine (2 of 2) 04/26/2022       Next Steps:   Schedule a office visit for Cervical, breast, colon and A1c Screenings    Type of outreach:    Sent [employer] message.      Questions for provider review:    None     Lexi Philip CMA  Chart routed to Care Team.

## 2023-02-21 ENCOUNTER — TELEPHONE (OUTPATIENT)
Dept: FAMILY MEDICINE | Facility: CLINIC | Age: 53
End: 2023-02-21
Payer: COMMERCIAL

## 2023-02-21 NOTE — TELEPHONE ENCOUNTER
Reason for Call:  Appointment Request    Patient requesting this type of appt:  Preventive     Requested provider: Linh Low    Reason patient unable to be scheduled: Not within requested timeframe    When does patient want to be seen/preferred time: 1-2 weeks    Comments: appointment for physical    Could we send this information to you in [facility] or would you prefer to receive a phone call?:   Patient would prefer a phone call   Okay to leave a detailed message?: Yes at Cell number on file:    Telephone Information:   Mobile [phone]       Call taken on 2/21/2023 at 2:30 PM by Alexandra Mooney

## 2023-02-22 DIAGNOSIS — E11.65 UNCONTROLLED TYPE 2 DIABETES MELLITUS WITH HYPERGLYCEMIA (H): ICD-10-CM

## 2023-02-22 RX ORDER — METFORMIN HCL 500 MG
500 TABLET, EXTENDED RELEASE 24 HR ORAL 2 TIMES DAILY WITH MEALS
Qty: 180 TABLET | Refills: 0 | Status: SHIPPED | OUTPATIENT
Start: 2023-02-22 | End: 2023-09-07

## 2023-02-24 NOTE — TELEPHONE ENCOUNTER
Patient Quality Outreach    Patient is due for the following:   Diabetes -  A1C and Eye Exam  Breast Cancer Screening - Mammogram  Cervical Cancer Screening - PAP Needed  Physical Preventive Adult Physical      Topic Date Due     Pneumococcal Vaccine (1 - PCV) Never done     Hepatitis B Vaccine (3 of 3 - 19+ 3-dose series) 05/03/2009     COVID-19 Vaccine (3 - Booster for Pfizer series) 04/26/2022     Flu Vaccine (1) 09/01/2022     Zoster (Shingles) Vaccine (2 of 2) 04/26/2022       Next Steps:   Patient has upcoming appointment, these items will be addressed at that time. 05/03/2023 with Dr. Land    Type of outreach:    Sent [employer] message.    Next Steps:  Reach out within 90 days via Letter.    Max number of attempts reached: Yes. Will try again in 90 days if patient still on fail list.    Questions for provider review:    None     Lexi Philip CMA  Chart routed to Care Team.

## 2023-03-09 ENCOUNTER — TELEPHONE (OUTPATIENT)
Dept: FAMILY MEDICINE | Facility: CLINIC | Age: 53
End: 2023-03-09
Payer: COMMERCIAL

## 2023-03-09 NOTE — TELEPHONE ENCOUNTER
Patient Quality Outreach    Patient is due for the following:   Diabetes -  A1C  Breast Cancer Screening - Mammogram  Cervical Cancer Screening - PAP Needed  Depression  -  PHQ-9 needed  Physical Annual Wellness Visit      Topic Date Due     Pneumococcal Vaccine (1 - PCV) Never done     Hepatitis B Vaccine (3 of 3 - 19+ 3-dose series) 05/03/2009     COVID-19 Vaccine (3 - Booster for Pfizer series) 04/26/2022     Flu Vaccine (1) 09/01/2022     Zoster (Shingles) Vaccine (2 of 2) 04/26/2022       Next Steps:   Patient has upcoming appointment, these items will be addressed at that time.    Type of outreach:    Chart review performed, no outreach needed.    Next Steps:  Reach out within 90 days via [employer].    Max number of attempts reached: Yes. Will try again in 90 days if patient still on fail list.    Questions for provider review:    None     Lexi Philip CMA  Chart routed to Care Team.

## 2023-03-10 NOTE — TELEPHONE ENCOUNTER
Patient Quality Outreach    Patient is due for the following:   Diabetes -  A1C, Eye Exam and Foot Exam  Colon Cancer Screening  Breast Cancer Screening - Mammogram  Cervical Cancer Screening - PAP Needed  Physical Preventive Adult Physical      Topic Date Due     Pneumococcal Vaccine (1 - PCV) Never done     Hepatitis B Vaccine (3 of 3 - 19+ 3-dose series) 05/03/2009     COVID-19 Vaccine (3 - Booster for Pfizer series) 04/26/2022     Flu Vaccine (1) 09/01/2022     Zoster (Shingles) Vaccine (2 of 2) 04/26/2022       Next Steps:   Patient has upcoming appointment, these items will be addressed at that time.    Type of outreach:    Sent [employer] message.    Next Steps:  Reach out within 90 days via none.    Max number of attempts reached: Yes. Will try again in 90 days if patient still on fail list.    Questions for provider review:    None     Lexi Philip [facility]  Chart routed to Care Team.

## 2023-03-26 ENCOUNTER — HEALTH MAINTENANCE LETTER (OUTPATIENT)
Age: 53
End: 2023-03-26

## 2023-06-01 ENCOUNTER — HEALTH MAINTENANCE LETTER (OUTPATIENT)
Age: 53
End: 2023-06-01

## 2023-06-17 ENCOUNTER — HEALTH MAINTENANCE LETTER (OUTPATIENT)
Age: 53
End: 2023-06-17

## 2023-08-26 ENCOUNTER — HEALTH MAINTENANCE LETTER (OUTPATIENT)
Age: 53
End: 2023-08-26

## 2023-09-07 DIAGNOSIS — E11.65 UNCONTROLLED TYPE 2 DIABETES MELLITUS WITH HYPERGLYCEMIA (H): ICD-10-CM

## 2023-09-07 RX ORDER — METFORMIN HCL 500 MG
500 TABLET, EXTENDED RELEASE 24 HR ORAL 2 TIMES DAILY WITH MEALS
Qty: 180 TABLET | Refills: 0 | Status: SHIPPED | OUTPATIENT
Start: 2023-09-07 | End: 2023-09-08

## 2023-09-08 ENCOUNTER — VIRTUAL VISIT (OUTPATIENT)
Dept: INTERNAL MEDICINE | Facility: CLINIC | Age: 53
End: 2023-09-08
Payer: COMMERCIAL

## 2023-09-08 DIAGNOSIS — E11.65 UNCONTROLLED TYPE 2 DIABETES MELLITUS WITH HYPERGLYCEMIA (H): ICD-10-CM

## 2023-09-08 DIAGNOSIS — Z12.31 VISIT FOR SCREENING MAMMOGRAM: ICD-10-CM

## 2023-09-08 DIAGNOSIS — Z12.11 SCREEN FOR COLON CANCER: Primary | ICD-10-CM

## 2023-09-08 PROCEDURE — 99213 OFFICE O/P EST LOW 20 MIN: CPT | Mod: 93 | Performed by: NURSE PRACTITIONER

## 2023-09-08 RX ORDER — LANCETS 33 GAUGE
1 EACH MISCELLANEOUS 2 TIMES DAILY
Qty: 100 EACH | Refills: 12 | Status: SHIPPED | OUTPATIENT
Start: 2023-09-08

## 2023-09-08 RX ORDER — BLOOD-GLUCOSE METER
1 EACH MISCELLANEOUS
Qty: 1 KIT | Refills: 0 | Status: SHIPPED | OUTPATIENT
Start: 2023-09-11

## 2023-09-08 RX ORDER — METFORMIN HCL 500 MG
500 TABLET, EXTENDED RELEASE 24 HR ORAL 2 TIMES DAILY WITH MEALS
Qty: 180 TABLET | Refills: 3 | Status: SHIPPED | OUTPATIENT
Start: 2023-09-08

## 2023-09-08 NOTE — PROGRESS NOTES
Susan is a 52 year old who is being evaluated via a billable telephone visit.      What phone number would you like to be contacted at? [phone]  How would you like to obtain your AVS? Dylon    [place] (provider location):  On-site    Assessment & Plan     Uncontrolled type 2 diabetes mellitus with hyperglycemia (H)  Needs refill on med and needs updated lab    - metFORMIN (GLUCOPHAGE XR) 500 MG 24 hr tablet; Take 1 tablet (500 mg) by mouth 2 times daily (with meals)  - blood glucose (CONTOUR NEXT TEST) test strip; 1 strip by In Vitro route 2 times daily  - OneTouch Delica Lancets 33G MISC; 1 each 2 times daily  - Contour Next EZ (CONTOUR NEXT EZ W/DEVICE KIT) w/Device KIT; 1 kit twice a week Use to test blood sugars 2 times daily or as directed.  Ok to substitute alternative if insurance prefers.  - Hemoglobin A1c; Future  - Lipid panel reflex to direct LDL Fasting; Future  - Comprehensive metabolic panel (BMP + Alb, Alk Phos, ALT, AST, Total. Bili, TP); Future  - TSH with free T4 reflex; Future    Screen for colon cancer    - Colonoscopy Screening  Referral; Future    Visit for screening mammogram    - MA SCREENING DIGITAL BILAT - Future  (s+30); Future      25 minutes spent by me on the date of the encounter doing chart review, history and exam, documentation and further activities per the note       Patient Instructions   Lab when you can fasting     Medication refilled     DANIKA Grissom CNP  [facility]    Martin Davis is a 52 year old, presenting for the following health issues:  Recheck Medication        9/8/2023     1:44 PM   Additional Questions   Roomed by Tamar   Accompanied by self         9/8/2023     1:44 PM   Patient Reported Additional Medications   Patient reports taking the following new medications no       HPI     She needs refill on medication and due for lab   She will make appointment for this   Last A1c  acceptable  Glucose have been less than 150 for most part        Lab Results   Component Value Date    A1C 6.6 04/12/2022           Review of Systems   Constitutional, HEENT, cardiovascular, pulmonary, GI, , musculoskeletal, neuro, skin, endocrine and psych systems are negative, except as otherwise noted.      Objective           Vitals:  No vitals were obtained today due to virtual visit.    Physical Exam   alert and no distress  PSYCH: Alert and oriented times 3; coherent speech, normal   rate and volume, able to articulate logical thoughts, able   to abstract reason, no tangential thoughts, no hallucinations   or delusions  Her affect is normal  RESP: No cough, no audible wheezing, able to talk in full sentences  Remainder of exam unable to be completed due to telephone visits    Lab             Phone call duration: 25 minutes

## 2023-09-18 ENCOUNTER — LAB (OUTPATIENT)
Dept: LAB | Facility: CLINIC | Age: 53
End: 2023-09-18
Payer: COMMERCIAL

## 2023-09-18 DIAGNOSIS — E11.65 UNCONTROLLED TYPE 2 DIABETES MELLITUS WITH HYPERGLYCEMIA (H): Primary | ICD-10-CM

## 2023-09-18 LAB
ALBUMIN SERPL BCG-MCNC: 4.2 G/DL (ref 3.5–5.2)
ALP SERPL-CCNC: 95 U/L (ref 35–104)
ALT SERPL W P-5'-P-CCNC: 31 U/L (ref 0–50)
ANION GAP SERPL CALCULATED.3IONS-SCNC: 9 MMOL/L (ref 7–15)
AST SERPL W P-5'-P-CCNC: 29 U/L (ref 0–45)
BILIRUB SERPL-MCNC: 0.3 MG/DL
BUN SERPL-MCNC: 10.5 MG/DL (ref 6–20)
CALCIUM SERPL-MCNC: 9.1 MG/DL (ref 8.6–10)
CHLORIDE SERPL-SCNC: 108 MMOL/L (ref 98–107)
CHOLEST SERPL-MCNC: 234 MG/DL
CREAT SERPL-MCNC: 0.66 MG/DL (ref 0.51–0.95)
CREAT UR-MCNC: 113 MG/DL
DEPRECATED HCO3 PLAS-SCNC: 24 MMOL/L (ref 22–29)
EGFRCR SERPLBLD CKD-EPI 2021: >90 ML/MIN/1.73M2
GLUCOSE SERPL-MCNC: 129 MG/DL (ref 70–99)
HBA1C MFR BLD: 6.1 % (ref 0–5.6)
HDLC SERPL-MCNC: 54 MG/DL
LDLC SERPL CALC-MCNC: 141 MG/DL
MICROALBUMIN UR-MCNC: 23.7 MG/L
MICROALBUMIN/CREAT UR: 20.97 MG/G CR (ref 0–25)
NONHDLC SERPL-MCNC: 180 MG/DL
POTASSIUM SERPL-SCNC: 4.2 MMOL/L (ref 3.4–5.3)
PROT SERPL-MCNC: 7.5 G/DL (ref 6.4–8.3)
SODIUM SERPL-SCNC: 141 MMOL/L (ref 136–145)
TRIGL SERPL-MCNC: 194 MG/DL
TSH SERPL DL<=0.005 MIU/L-ACNC: 1.87 UIU/ML (ref 0.3–4.2)

## 2023-09-18 PROCEDURE — 84443 ASSAY THYROID STIM HORMONE: CPT

## 2023-09-18 PROCEDURE — 82570 ASSAY OF URINE CREATININE: CPT

## 2023-09-18 PROCEDURE — 82043 UR ALBUMIN QUANTITATIVE: CPT

## 2023-09-18 PROCEDURE — 80053 COMPREHEN METABOLIC PANEL: CPT

## 2023-09-18 PROCEDURE — 36415 COLL VENOUS BLD VENIPUNCTURE: CPT

## 2023-09-18 PROCEDURE — 80061 LIPID PANEL: CPT

## 2023-09-18 PROCEDURE — 83036 HEMOGLOBIN GLYCOSYLATED A1C: CPT

## 2023-09-19 DIAGNOSIS — E78.00 HYPERCHOLESTEREMIA: Primary | ICD-10-CM

## 2023-09-19 RX ORDER — ROSUVASTATIN CALCIUM 20 MG/1
20 TABLET, COATED ORAL DAILY
Qty: 90 TABLET | Refills: 3 | Status: SHIPPED | OUTPATIENT
Start: 2023-09-19

## 2023-10-17 ENCOUNTER — TELEPHONE (OUTPATIENT)
Dept: INTERNAL MEDICINE | Facility: CLINIC | Age: 53
End: 2023-10-17
Payer: COMMERCIAL

## 2023-10-17 NOTE — TELEPHONE ENCOUNTER
Patient Quality Outreach    Patient is due for the following:   Diabetes -  Eye Exam and Foot Exam  Colon Cancer Screening  Breast Cancer Screening - Mammogram  Physical Preventive Adult Physical    Next Steps:   No follow up needed at this time.    Type of outreach:    Chart review performed, no outreach needed.      Questions for provider review:    None           Kemi Covington LPN

## 2024-01-13 ENCOUNTER — HEALTH MAINTENANCE LETTER (OUTPATIENT)
Age: 54
End: 2024-01-13

## 2024-02-13 ENCOUNTER — OFFICE VISIT (OUTPATIENT)
Dept: FAMILY MEDICINE | Facility: CLINIC | Age: 54
End: 2024-02-13
Payer: COMMERCIAL

## 2024-02-13 VITALS
WEIGHT: 183.4 LBS | RESPIRATION RATE: 16 BRPM | DIASTOLIC BLOOD PRESSURE: 73 MMHG | OXYGEN SATURATION: 96 % | HEART RATE: 85 BPM | SYSTOLIC BLOOD PRESSURE: 121 MMHG | TEMPERATURE: 98.3 F | HEIGHT: 57 IN | BODY MASS INDEX: 39.57 KG/M2

## 2024-02-13 DIAGNOSIS — Z12.11 SCREEN FOR COLON CANCER: ICD-10-CM

## 2024-02-13 DIAGNOSIS — E66.812 CLASS 2 SEVERE OBESITY DUE TO EXCESS CALORIES WITH SERIOUS COMORBIDITY AND BODY MASS INDEX (BMI) OF 36.0 TO 36.9 IN ADULT (H): ICD-10-CM

## 2024-02-13 DIAGNOSIS — Z12.31 VISIT FOR SCREENING MAMMOGRAM: ICD-10-CM

## 2024-02-13 DIAGNOSIS — E66.01 CLASS 2 SEVERE OBESITY DUE TO EXCESS CALORIES WITH SERIOUS COMORBIDITY AND BODY MASS INDEX (BMI) OF 36.0 TO 36.9 IN ADULT (H): ICD-10-CM

## 2024-02-13 DIAGNOSIS — M54.6 ACUTE RIGHT-SIDED THORACIC BACK PAIN: ICD-10-CM

## 2024-02-13 DIAGNOSIS — R31.29 MICROHEMATURIA: Primary | ICD-10-CM

## 2024-02-13 DIAGNOSIS — E11.9 CONTROLLED TYPE 2 DIABETES MELLITUS WITHOUT COMPLICATION, WITHOUT LONG-TERM CURRENT USE OF INSULIN (H): ICD-10-CM

## 2024-02-13 DIAGNOSIS — Z12.4 CERVICAL CANCER SCREENING: ICD-10-CM

## 2024-02-13 PROBLEM — M54.50 ACUTE RIGHT-SIDED LOW BACK PAIN WITHOUT SCIATICA: Status: ACTIVE | Noted: 2024-02-13

## 2024-02-13 LAB
ALBUMIN UR-MCNC: NEGATIVE MG/DL
APPEARANCE UR: ABNORMAL
BILIRUB UR QL STRIP: NEGATIVE
COLOR UR AUTO: YELLOW
GLUCOSE UR STRIP-MCNC: NEGATIVE MG/DL
HGB UR QL STRIP: ABNORMAL
KETONES UR STRIP-MCNC: NEGATIVE MG/DL
LEUKOCYTE ESTERASE UR QL STRIP: NEGATIVE
NITRATE UR QL: NEGATIVE
PH UR STRIP: 5.5 [PH] (ref 5–7)
RBC #/AREA URNS AUTO: ABNORMAL /HPF
SP GR UR STRIP: >=1.03 (ref 1–1.03)
SQUAMOUS #/AREA URNS AUTO: ABNORMAL /LPF
UROBILINOGEN UR STRIP-ACNC: 0.2 E.U./DL
WBC #/AREA URNS AUTO: ABNORMAL /HPF

## 2024-02-13 PROCEDURE — 82274 ASSAY TEST FOR BLOOD FECAL: CPT | Performed by: FAMILY MEDICINE

## 2024-02-13 PROCEDURE — 99214 OFFICE O/P EST MOD 30 MIN: CPT | Performed by: FAMILY MEDICINE

## 2024-02-13 PROCEDURE — 81001 URINALYSIS AUTO W/SCOPE: CPT | Performed by: FAMILY MEDICINE

## 2024-02-13 RX ORDER — TIZANIDINE 2 MG/1
2 TABLET ORAL 3 TIMES DAILY
Qty: 30 TABLET | Refills: 0 | Status: SHIPPED | OUTPATIENT
Start: 2024-02-13

## 2024-02-13 ASSESSMENT — PAIN SCALES - GENERAL: PAINLEVEL: EXTREME PAIN (8)

## 2024-02-13 ASSESSMENT — PATIENT HEALTH QUESTIONNAIRE - PHQ9
SUM OF ALL RESPONSES TO PHQ QUESTIONS 1-9: 8
10. IF YOU CHECKED OFF ANY PROBLEMS, HOW DIFFICULT HAVE THESE PROBLEMS MADE IT FOR YOU TO DO YOUR WORK, TAKE CARE OF THINGS AT HOME, OR GET ALONG WITH OTHER PEOPLE: NOT DIFFICULT AT ALL
SUM OF ALL RESPONSES TO PHQ QUESTIONS 1-9: 8

## 2024-02-13 ASSESSMENT — ENCOUNTER SYMPTOMS: BACK PAIN: 1

## 2024-02-13 NOTE — PROGRESS NOTES
"  Assessment & Plan     Acute right-sided thoracic back pain  Pending   - UA Macroscopic with reflex to Microscopic and Culture - Lab Collect; Future    - Physical Therapy Referral; Future  - tiZANidine (ZANAFLEX) 2 MG tablet; Take 1 tablet (2 mg) by mouth 3 times daily  - UA Macroscopic with reflex to Microscopic and Culture - Lab Collect  - UA Microscopic with Reflex to Culture    Controlled type 2 diabetes mellitus without complication, without long-term current use of insulin (H)  Discussed she is due for follow up   - Adult Eye  Referral; Future    Class 2 severe obesity due to excess calories with serious comorbidity and body mass index (BMI) of 36.0 to 36.9 in adult (H)  Losing wt will help back     Screen for colon cancer  Advised colonoscopy or   - Fecal colorectal cancer screen (FIT); Future  - Fecal colorectal cancer screen (FIT)    Cervical cancer screening  Discussed she is due for pap     Visit for screening mammogram  Advised     Ordering of each unique test  Prescription drug management        BMI  Estimated body mass index is 39.68 kg/m  as calculated from the following:    Height as of this encounter: 1.448 m (4' 9.01\").    Weight as of this encounter: 83.2 kg (183 lb 6.4 oz).   Weight management plan: as above          Martin Davis is a 53 year old, presenting for the following health issues:  Back Pain (Right side of mid back worse with laying down. )        2/13/2024     9:52 AM   Additional Questions   Roomed by Junie     History of Present Illness       Back Pain:  She presents for follow up of back pain. Patient's back pain is a new problem.    Original cause of back pain: not sure  First noticed back pain: 1-4 weeks ago  Patient feels back pain: dailyLocation of back pain:  Right upper back  Description of back pain: other  Back pain spreads: nowhere    Since patient first noticed back pain, pain is: always present, but gets better and worse  Does back pain interfere with " her job:  No  On a scale of 1-10 (10 being the worst), patient describes pain as:  7  What makes back pain worse: lying down   Acupuncture: not tried  Acetaminophen: not tried  Activity or exercise: not tried  Chiropractor:  Not tried  Cold: not tried  Heat: not tried  Massage: not helpful  Muscle relaxants: not tried  NSAIDS: helpful  Opioids: not tried  Physical Therapy: not tried  Rest: not tried  Steroid Injection: not tried  Stretching: not tried  Surgery: not tried  TENS unit: not tried  Topical pain relievers: not tried  Other healthcare providers patient is seeing for back pain: None    Reason for visit:  Diferent simptoms (Left sided chest and shoulder pain)  Symptom onset:  1-2 weeks ago  Symptom intensity:  Severe  Symptom progression:  Staying the same  Had these symptoms before:  No  What makes it better:  Taking advil    She eats 0-1 servings of fruits and vegetables daily.She consumes 2 sweetened beverage(s) daily.She exercises with enough effort to increase her heart rate 10 to 19 minutes per day.  She is missing 1 dose(s) of medications per week.  She is not taking prescribed medications regularly due to remembering to take.         Pain History:  When did you first notice your pain? 2 weeks   Have you seen anyone else for your pain? No  How has your pain affected your ability to work? Not applicable  Where in your body do you have pain? Back Pain  Onset/Duration: 2 weeks   Description:   Location of pain: Right mid back pain  Character of pain: dull ache and worse when she Lies down  Pain radiation: none  New numbness or weakness in legs, not attributed to pain: no   Intensity: C mild, moderate  Progression of Symptoms: same  History:   Specific cause: none  Pain interferes with job: No  History of back problems: no prior back problems  Any previous MRI or X-rays: None  Sees a specialist for back pain: No  Alleviating factors:   Improved by: sitting    Precipitating factors:  Worsened by: Lying  "Flat  Therapies tried and outcome: NSAIDs    Accompanying Signs & Symptoms:  Risk of Fracture: None  Risk of Cauda Equina: None  Risk of Infection: None  Risk of Cancer: None  Risk of Ankylosing Spondylitis: Onset at age <35, male, AND morning back stiffness  no     Review of Systems  CONSTITUTIONAL: NEGATIVE for fever, chills, change in weight  ENT/MOUTH: NEGATIVE for ear, mouth and throat problems  RESP: NEGATIVE for significant cough or SOB  GI: NEGATIVE for nausea, abdominal pain, heartburn, or change in bowel habits  MUSCULOSKELETAL: none  ENDOCRINE: none   ROS otherwise negative      Objective    /73   Pulse 85   Temp 98.3  F (36.8  C) (Temporal)   Resp 16   Ht 1.448 m (4' 9.01\")   Wt 83.2 kg (183 lb 6.4 oz)   SpO2 96%   BMI 39.68 kg/m    Body mass index is 39.68 kg/m .  Physical Exam   GENERAL: alert and no distress  NECK: no adenopathy, no asymmetry, masses, or scars  RESP: lungs clear to auscultation - no rales, rhonchi or wheezes  CV: regular rate and rhythm, normal S1 S2, no S3 or S4, no murmur, click or rub, no peripheral edema  ABDOMEN: soft, nontender, no hepatosplenomegaly, no masses and bowel sounds normal  MS: no gross musculoskeletal defects noted, no edema  Comprehensive back pain exam:  No tenderness, Range of motion not limited by pain, Lower extremity strength functional and equal on both sides, Lower extremity sensation normal and equal on both sides, and Straight leg raise negative bilaterally  PSYCH: mentation appears normal, affect normal/bright    Pending labs         Signed Electronically by: Alexandra Arevalo MD    "

## 2024-02-13 NOTE — PATIENT INSTRUCTIONS
When You Have Low Back Pain    Caring for Your Back  You are not alone.    Low back pain is very common. Nearly half of all adults have low back pain in any given year. The good news is that back pain is rarely a danger to your health. Most people can manage their back pain on their own and about half of them start feeling better within 2 weeks. In 9 out of 10 cases, low back pain goes away or no longer limits daily activity within 6 weeks.     Your outlook is good!    Your symptoms tell us that your low back pain is most likely not a danger to you. Most of the time we do not know the exact cause of low back pain, even if you see a doctor or have an MRI. However, treatment can still work without knowing the cause of the pain. Less than 1 in 100 people need surgery for their back pain.     What can I do about my low back pain?     There are three things you can do to ease low back pain and help it go away.   Use heat or cold packs.   Take medicine as directed.   Use positions, movements and exercises.     Using heat or cold packs    Try cold packs or gentle heat to ease your pain. Use whichever gives you the most relief. Apply the cold pack or heat for 15 minutes at a time, as often as needed.    Taking medicine     If your doctor has prescribed medicine, be sure to follow the directions.   If you take over-the-counter medicine, read and follow the directions.   Talk to your doctor if you have any questions.     Using positions, movements and exercises    Research tells us that moving your joints and muscles can help you recover from back pain. Such activity should be simple and gentle. Use the positions in the photos as well as walking to help relieve your pain. Try taking a short walk every 3 to 4 hours during the day. Walk for a few minutes inside your home or take longer walks outside, on a treadmill or at a mall. Slowly increase the amount of time you walk. Expect discomfort when you begin, but it should lessen  as your back starts to heal. When your back feels better, walk daily to keep your back and body healthy.    Finding a comfortable position    When your back pain is new, certain positions will ease your pain. Gently try each of the positions below until you find one that is helpful. Once you find a position of comfort, use it as often as you like when you are resting. You will recover faster if you combine rest with activity.         Lie on your back with your legs bent. You can do this by placing a pillow under your knees. Or you may lie on the floor and rest your lower legs on the seat of a chair.       Lie on your side with your knees bent, and place a pillow between your knees.       Lie on your stomach over pillows.      When should I call my doctor?    Your back pain should improve over the first couple of weeks. As it improves, you should be able to return to your normal activities. But call your doctor if:   You have a sudden change in your ability to control your bladder or bowels.   You feel tingling in your groin or legs.   The pain spreads down your leg and into your foot.   Your toes, feet or leg muscles feel weak.   You feel generally unwell or sick.   Your pain does not get better or gets worse.      If you are deaf or hard of hearing, please let us know. We provide many free services including sign language interpreters,oral interpreters, TTYs, telephone amplifiers, note takers and written materials.    For informational purposes only. Not to replace the advice of your health care provider. Copyright   2013 [employer] [facility]. All rights reserved. SCOUPY [id number] - Rev 06/14.

## 2024-02-15 ENCOUNTER — TELEPHONE (OUTPATIENT)
Dept: FAMILY MEDICINE | Facility: CLINIC | Age: 54
End: 2024-02-15
Payer: COMMERCIAL

## 2024-02-15 NOTE — TELEPHONE ENCOUNTER
Called patient. Left voice message to return call at [phone].    Imaging scheduling for CT abdomen/Pelvis- [phone]    Jesusita Morrison RN

## 2024-02-15 NOTE — TELEPHONE ENCOUNTER
----- Message from Alexandra Arevalo MD sent at 2/15/2024  2:29 PM CST -----  Urine shows some Blood in Urine  ? Stone vs other   Recommend scan-order placed

## 2024-02-15 NOTE — TELEPHONE ENCOUNTER
Pt returned call and was given Provider's message as written. Pt was given number for imaging scheduling and also number for billing to see if they can give her an estimate for cost.    Samara Chaidez RN  [facility]

## 2024-02-20 LAB — HEMOCCULT STL QL IA: NEGATIVE

## 2024-02-23 ENCOUNTER — ANCILLARY PROCEDURE (OUTPATIENT)
Dept: CT IMAGING | Facility: CLINIC | Age: 54
End: 2024-02-23
Attending: FAMILY MEDICINE
Payer: COMMERCIAL

## 2024-02-23 DIAGNOSIS — R31.29 MICROHEMATURIA: ICD-10-CM

## 2024-02-23 PROCEDURE — 74176 CT ABD & PELVIS W/O CONTRAST: CPT | Mod: TC | Performed by: RADIOLOGY

## 2024-02-27 ENCOUNTER — ANCILLARY PROCEDURE (OUTPATIENT)
Dept: MAMMOGRAPHY | Facility: CLINIC | Age: 54
End: 2024-02-27
Attending: FAMILY MEDICINE
Payer: COMMERCIAL

## 2024-02-27 DIAGNOSIS — Z12.31 VISIT FOR SCREENING MAMMOGRAM: ICD-10-CM

## 2024-02-27 PROCEDURE — 77067 SCR MAMMO BI INCL CAD: CPT | Mod: TC | Performed by: RADIOLOGY

## 2024-06-01 ENCOUNTER — HEALTH MAINTENANCE LETTER (OUTPATIENT)
Age: 54
End: 2024-06-01

## 2024-08-10 ENCOUNTER — HEALTH MAINTENANCE LETTER (OUTPATIENT)
Age: 54
End: 2024-08-10

## 2024-10-19 ENCOUNTER — HEALTH MAINTENANCE LETTER (OUTPATIENT)
Age: 54
End: 2024-10-19

## 2024-11-14 ENCOUNTER — TELEPHONE (OUTPATIENT)
Dept: FAMILY MEDICINE | Facility: CLINIC | Age: 54
End: 2024-11-14

## 2024-11-14 NOTE — LETTER
November 14, 2024        To  Susan Velez  [address]  [place] [address]    Your team at [facility] cares about your health. We have reviewed your chart and based on our findings; we are making the following recommendations to better manage your health.     You are in particular need of attention regarding the following:     Schedule a DIABETIC FOLLOW UP appointment for Office Visit. Patients with diabetes should see their provider regularly.  Schedule a primary care office visit with your provider for a Pap Smear to screen for Cervical Cancer.  PREVENTATIVE VISIT: Physical    If you have already completed these items, please contact the clinic via phone or   [facility] so your care team can review and update your records. Thank you for   choosing [facility] for your healthcare needs. For any questions,   concerns, or to schedule an appointment please contact our clinic.    Healthy Regards,      Your [facility] Care Team

## 2024-11-14 NOTE — TELEPHONE ENCOUNTER
Patient Quality Outreach    Patient is due for the following:   Diabetes -  Diabetic Follow-Up Visit  Cervical Cancer Screening - PAP Needed  Physical Preventive Adult Physical    Action(s) Taken:   Schedule a Adult Preventative    Type of outreach:    Sent letter.    Questions for provider review:    None           RAMU LINDQUIST MA  Chart routed to none  .

## 2025-01-25 ENCOUNTER — HEALTH MAINTENANCE LETTER (OUTPATIENT)
Age: 55
End: 2025-01-25

## 2025-01-29 ENCOUNTER — OFFICE VISIT (OUTPATIENT)
Dept: INTERNAL MEDICINE | Facility: CLINIC | Age: 55
End: 2025-01-29
Payer: COMMERCIAL

## 2025-01-29 ENCOUNTER — ORDERS ONLY (AUTO-RELEASED) (OUTPATIENT)
Dept: INTERNAL MEDICINE | Facility: CLINIC | Age: 55
End: 2025-01-29

## 2025-01-29 VITALS
HEART RATE: 66 BPM | DIASTOLIC BLOOD PRESSURE: 83 MMHG | OXYGEN SATURATION: 99 % | SYSTOLIC BLOOD PRESSURE: 128 MMHG | TEMPERATURE: 97.1 F | BODY MASS INDEX: 37.54 KG/M2 | HEIGHT: 57 IN | RESPIRATION RATE: 18 BRPM | WEIGHT: 174 LBS

## 2025-01-29 DIAGNOSIS — E66.01 CLASS 2 SEVERE OBESITY DUE TO EXCESS CALORIES WITH SERIOUS COMORBIDITY AND BODY MASS INDEX (BMI) OF 36.0 TO 36.9 IN ADULT (H): ICD-10-CM

## 2025-01-29 DIAGNOSIS — R07.89 OTHER CHEST PAIN: ICD-10-CM

## 2025-01-29 DIAGNOSIS — E78.00 HYPERCHOLESTEREMIA: ICD-10-CM

## 2025-01-29 DIAGNOSIS — E66.812 CLASS 2 SEVERE OBESITY DUE TO EXCESS CALORIES WITH SERIOUS COMORBIDITY AND BODY MASS INDEX (BMI) OF 36.0 TO 36.9 IN ADULT (H): ICD-10-CM

## 2025-01-29 DIAGNOSIS — Z12.11 SCREEN FOR COLON CANCER: ICD-10-CM

## 2025-01-29 DIAGNOSIS — E11.65 UNCONTROLLED TYPE 2 DIABETES MELLITUS WITH HYPERGLYCEMIA (H): Primary | ICD-10-CM

## 2025-01-29 DIAGNOSIS — F32.A ANXIETY AND DEPRESSION: ICD-10-CM

## 2025-01-29 DIAGNOSIS — Z13.29 SCREENING FOR ENDOCRINE DISORDER: ICD-10-CM

## 2025-01-29 DIAGNOSIS — F41.9 ANXIETY AND DEPRESSION: ICD-10-CM

## 2025-01-29 LAB
ANION GAP SERPL CALCULATED.3IONS-SCNC: 9 MMOL/L (ref 7–15)
BUN SERPL-MCNC: 13 MG/DL (ref 6–20)
CALCIUM SERPL-MCNC: 10.2 MG/DL (ref 8.8–10.4)
CHLORIDE SERPL-SCNC: 105 MMOL/L (ref 98–107)
CHOLEST SERPL-MCNC: 243 MG/DL
CREAT SERPL-MCNC: 0.74 MG/DL (ref 0.51–0.95)
CREAT UR-MCNC: 225 MG/DL
EGFRCR SERPLBLD CKD-EPI 2021: >90 ML/MIN/1.73M2
EST. AVERAGE GLUCOSE BLD GHB EST-MCNC: 154 MG/DL
FASTING STATUS PATIENT QL REPORTED: YES
FASTING STATUS PATIENT QL REPORTED: YES
GLUCOSE SERPL-MCNC: 154 MG/DL (ref 70–99)
HBA1C MFR BLD: 7 % (ref 0–5.6)
HCO3 SERPL-SCNC: 26 MMOL/L (ref 22–29)
HDLC SERPL-MCNC: 60 MG/DL
LDLC SERPL CALC-MCNC: 150 MG/DL
MICROALBUMIN UR-MCNC: 13.6 MG/L
MICROALBUMIN/CREAT UR: 6.04 MG/G CR (ref 0–25)
NONHDLC SERPL-MCNC: 183 MG/DL
POTASSIUM SERPL-SCNC: 4.3 MMOL/L (ref 3.4–5.3)
SODIUM SERPL-SCNC: 140 MMOL/L (ref 135–145)
TRIGL SERPL-MCNC: 167 MG/DL
TSH SERPL DL<=0.005 MIU/L-ACNC: 1.86 UIU/ML (ref 0.3–4.2)

## 2025-01-29 PROCEDURE — 80048 BASIC METABOLIC PNL TOTAL CA: CPT

## 2025-01-29 PROCEDURE — 80061 LIPID PANEL: CPT

## 2025-01-29 PROCEDURE — 99215 OFFICE O/P EST HI 40 MIN: CPT

## 2025-01-29 PROCEDURE — 84443 ASSAY THYROID STIM HORMONE: CPT

## 2025-01-29 PROCEDURE — 83036 HEMOGLOBIN GLYCOSYLATED A1C: CPT

## 2025-01-29 PROCEDURE — 93000 ELECTROCARDIOGRAM COMPLETE: CPT

## 2025-01-29 PROCEDURE — 82570 ASSAY OF URINE CREATININE: CPT

## 2025-01-29 PROCEDURE — 36415 COLL VENOUS BLD VENIPUNCTURE: CPT

## 2025-01-29 PROCEDURE — 82043 UR ALBUMIN QUANTITATIVE: CPT

## 2025-01-29 RX ORDER — ROSUVASTATIN CALCIUM 20 MG/1
20 TABLET, COATED ORAL DAILY
Qty: 90 TABLET | Refills: 3 | Status: SHIPPED | OUTPATIENT
Start: 2025-01-29

## 2025-01-29 RX ORDER — METFORMIN HYDROCHLORIDE 500 MG/1
500 TABLET, EXTENDED RELEASE ORAL 2 TIMES DAILY WITH MEALS
Qty: 180 TABLET | Refills: 3 | Status: SHIPPED | OUTPATIENT
Start: 2025-01-29

## 2025-01-29 ASSESSMENT — SOCIAL DETERMINANTS OF HEALTH (SDOH)
WITHIN THE LAST YEAR, HAVE YOU BEEN AFRAID OF YOUR PARTNER OR EX-PARTNER?: NO
WITHIN THE LAST YEAR, HAVE YOU BEEN HUMILIATED OR EMOTIONALLY ABUSED IN OTHER WAYS BY YOUR PARTNER OR EX-PARTNER?: YES
WITHIN THE LAST YEAR, HAVE TO BEEN RAPED OR FORCED TO HAVE ANY KIND OF SEXUAL ACTIVITY BY YOUR PARTNER OR EX-PARTNER?: YES
WITHIN THE LAST YEAR, HAVE YOU BEEN KICKED, HIT, SLAPPED, OR OTHERWISE PHYSICALLY HURT BY YOUR PARTNER OR EX-PARTNER?: NO

## 2025-01-29 NOTE — PATIENT INSTRUCTIONS
Lambs wool can help pad and cushion foot     [employer] Prater's Wool Padding, 3/8 Oz, Cushions And Separates Toes    Should make an appointment for Physical
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
## 2025-02-14 ENCOUNTER — TELEPHONE (OUTPATIENT)
Dept: FAMILY MEDICINE | Facility: CLINIC | Age: 55
End: 2025-02-14
Payer: COMMERCIAL

## 2025-02-14 NOTE — TELEPHONE ENCOUNTER
"Patient calling to follow-up regarding results from 1/29:    Per result note:  \"Hi Susan, You A1C level is quite elevated. I am recommending ether we try another medication. We could try a GLP-1 which could be helpful with weight loss as well or increase your metformin. With your elevated lipid values and increase in A1C it is recommended to start on statin. Diet and exercise a way to control these values. Eating foods low in saturated fats, low fat dairy and getting 4-5 servings of fruits and vegetables is recommended as well as getting 30 minutes of exercise a day or 150 minutes a week. As well as avoiding foods and beverages high in sugar. Another option is the supplement Bergamot has Sebastien been recommended to help lower these values 500-1,000 mg daily is recommended. \"    Patient requesting a prescription for GLP-1 to be sent to pharmacy as advised, she is agreeable to starting one.     Pharmacy pended.    SHENA Castellano RN  [facility]  "

## 2025-02-17 ENCOUNTER — OFFICE VISIT (OUTPATIENT)
Dept: BEHAVIORAL HEALTH | Facility: CLINIC | Age: 55
End: 2025-02-17
Payer: COMMERCIAL

## 2025-02-17 DIAGNOSIS — F41.9 ANXIETY AND DEPRESSION: ICD-10-CM

## 2025-02-17 DIAGNOSIS — F32.A ANXIETY AND DEPRESSION: ICD-10-CM

## 2025-02-17 PROCEDURE — 99207 PR NO BILLABLE SERVICE THIS VISIT: CPT

## 2025-02-17 ASSESSMENT — PATIENT HEALTH QUESTIONNAIRE - PHQ9
SUM OF ALL RESPONSES TO PHQ QUESTIONS 1-9: 10
10. IF YOU CHECKED OFF ANY PROBLEMS, HOW DIFFICULT HAVE THESE PROBLEMS MADE IT FOR YOU TO DO YOUR WORK, TAKE CARE OF THINGS AT HOME, OR GET ALONG WITH OTHER PEOPLE: NOT DIFFICULT AT ALL
SUM OF ALL RESPONSES TO PHQ QUESTIONS 1-9: 10

## 2025-02-17 NOTE — PROGRESS NOTES
"[facility] Warm-handoff    February 17, 2025  9:29 AM-9:43 AM  [facility] met with patient, by provider request. [facility] informed and explained integrated behavioral health model, which consists of brief time limited interventions around mental health and substance use concerns.     Presenting issue: The pt reported \" I am in physical therapy right now. My primary docotr sent me here because we talked about my mental health. I thought you would be a Croatian speaking therapist, I would prefer a Croatian speaking therapist.\" The [facility] called the intake team, intake found a clinician outside of the [facility] who speaks Croatian to see the pt. Pt declined to follow up at the time. Pt was given intake number to follow up on scheduling.    Disposition:   Recommended that patient call 911 or go to the local ED should there be a change in any of these risk factors      Julianna Estrada"

## 2025-02-17 NOTE — TELEPHONE ENCOUNTER
Patient will need appointment to discuss which one she wants to start and to go over side effects.

## 2025-05-03 ENCOUNTER — HEALTH MAINTENANCE LETTER (OUTPATIENT)
Age: 55
End: 2025-05-03

## 2025-08-16 ENCOUNTER — HEALTH MAINTENANCE LETTER (OUTPATIENT)
Age: 55
End: 2025-08-16